# Patient Record
Sex: FEMALE | Race: WHITE | NOT HISPANIC OR LATINO | Employment: OTHER | URBAN - METROPOLITAN AREA
[De-identification: names, ages, dates, MRNs, and addresses within clinical notes are randomized per-mention and may not be internally consistent; named-entity substitution may affect disease eponyms.]

---

## 2024-11-20 ENCOUNTER — TELEPHONE (OUTPATIENT)
Dept: FAMILY MEDICINE | Facility: CLINIC | Age: 74
End: 2024-11-20
Payer: MEDICARE

## 2024-11-20 NOTE — TELEPHONE ENCOUNTER
Called patient to remind her of her new patient appointment. No answer but left a detailed message and instructions to bring all medication and supplement bottles.

## 2024-11-27 ENCOUNTER — OFFICE VISIT (OUTPATIENT)
Dept: FAMILY MEDICINE | Facility: CLINIC | Age: 74
End: 2024-11-27
Payer: MEDICARE

## 2024-11-27 VITALS
DIASTOLIC BLOOD PRESSURE: 76 MMHG | OXYGEN SATURATION: 97 % | HEIGHT: 65 IN | BODY MASS INDEX: 32.32 KG/M2 | WEIGHT: 194 LBS | HEART RATE: 64 BPM | SYSTOLIC BLOOD PRESSURE: 134 MMHG

## 2024-11-27 DIAGNOSIS — Z13.820 OSTEOPOROSIS SCREENING: Chronic | ICD-10-CM

## 2024-11-27 DIAGNOSIS — Z12.31 OTHER SCREENING MAMMOGRAM: ICD-10-CM

## 2024-11-27 DIAGNOSIS — F41.1 GENERALIZED ANXIETY DISORDER: ICD-10-CM

## 2024-11-27 DIAGNOSIS — I10 PRIMARY HYPERTENSION: ICD-10-CM

## 2024-11-27 DIAGNOSIS — E78.2 MIXED HYPERLIPIDEMIA: ICD-10-CM

## 2024-11-27 DIAGNOSIS — I48.0 PAROXYSMAL A-FIB: Primary | ICD-10-CM

## 2024-11-27 DIAGNOSIS — G47.33 OSA (OBSTRUCTIVE SLEEP APNEA): ICD-10-CM

## 2024-11-27 DIAGNOSIS — Z76.89 ENCOUNTER TO ESTABLISH CARE WITH NEW DOCTOR: ICD-10-CM

## 2024-11-27 DIAGNOSIS — Z78.0 MENOPAUSE: ICD-10-CM

## 2024-11-27 PROBLEM — F34.1 DYSTHYMIA: Status: ACTIVE | Noted: 2024-11-27

## 2024-11-27 PROCEDURE — 3075F SYST BP GE 130 - 139MM HG: CPT | Mod: CPTII,S$GLB,, | Performed by: FAMILY MEDICINE

## 2024-11-27 PROCEDURE — 4010F ACE/ARB THERAPY RXD/TAKEN: CPT | Mod: CPTII,S$GLB,, | Performed by: FAMILY MEDICINE

## 2024-11-27 PROCEDURE — 1160F RVW MEDS BY RX/DR IN RCRD: CPT | Mod: CPTII,S$GLB,, | Performed by: FAMILY MEDICINE

## 2024-11-27 PROCEDURE — 3288F FALL RISK ASSESSMENT DOCD: CPT | Mod: CPTII,S$GLB,, | Performed by: FAMILY MEDICINE

## 2024-11-27 PROCEDURE — 3008F BODY MASS INDEX DOCD: CPT | Mod: CPTII,S$GLB,, | Performed by: FAMILY MEDICINE

## 2024-11-27 PROCEDURE — 3078F DIAST BP <80 MM HG: CPT | Mod: CPTII,S$GLB,, | Performed by: FAMILY MEDICINE

## 2024-11-27 PROCEDURE — 1159F MED LIST DOCD IN RCRD: CPT | Mod: CPTII,S$GLB,, | Performed by: FAMILY MEDICINE

## 2024-11-27 PROCEDURE — 99204 OFFICE O/P NEW MOD 45 MIN: CPT | Mod: S$GLB,,, | Performed by: FAMILY MEDICINE

## 2024-11-27 PROCEDURE — 1101F PT FALLS ASSESS-DOCD LE1/YR: CPT | Mod: CPTII,S$GLB,, | Performed by: FAMILY MEDICINE

## 2024-11-27 RX ORDER — PAROXETINE 10 MG/1
10 TABLET, FILM COATED ORAL EVERY MORNING
Qty: 90 TABLET | Refills: 3 | Status: SHIPPED | OUTPATIENT
Start: 2024-11-27 | End: 2025-11-27

## 2024-11-27 RX ORDER — METOPROLOL TARTRATE 25 MG/1
25 TABLET, FILM COATED ORAL 2 TIMES DAILY
COMMUNITY
End: 2024-11-27 | Stop reason: SDUPTHER

## 2024-11-27 RX ORDER — ROSUVASTATIN CALCIUM 20 MG/1
20 TABLET, COATED ORAL NIGHTLY
COMMUNITY
Start: 2024-09-25

## 2024-11-27 RX ORDER — ALPRAZOLAM 1 MG/1
1 TABLET ORAL 2 TIMES DAILY PRN
COMMUNITY
Start: 2024-09-27 | End: 2024-11-27 | Stop reason: SDUPTHER

## 2024-11-27 RX ORDER — ALPRAZOLAM 1 MG/1
1 TABLET ORAL 2 TIMES DAILY PRN
Qty: 60 TABLET | Refills: 5 | Status: SHIPPED | OUTPATIENT
Start: 2024-11-27

## 2024-11-27 RX ORDER — DOXEPIN HYDROCHLORIDE 10 MG/1
10 CAPSULE ORAL NIGHTLY PRN
COMMUNITY

## 2024-11-27 RX ORDER — ALPRAZOLAM 1 MG/1
1 TABLET, EXTENDED RELEASE ORAL DAILY
COMMUNITY
End: 2024-11-27

## 2024-11-27 RX ORDER — OLMESARTAN MEDOXOMIL 20 MG/1
20 TABLET ORAL DAILY
COMMUNITY

## 2024-11-27 RX ORDER — METOPROLOL TARTRATE 25 MG/1
25 TABLET, FILM COATED ORAL 2 TIMES DAILY
Qty: 180 TABLET | Refills: 1 | Status: SHIPPED | OUTPATIENT
Start: 2024-11-27

## 2024-11-27 NOTE — PROGRESS NOTES
SUBJECTIVE:    Patient ID: Scarlett Palacio is a 74 y.o. female.    Chief Complaint: Establish Care (New patient to establish care//brought med bottles//needs referral to cardiologist//needs refills//declined colonoscopy, mammogram and dexa ordered//declined flu vac//tc)    Patient with paroxysmal atrial fibrillation who reports history of cardiac ablation is here to establish care with a new physician.  Has moved from [place].  Currently taking metoprolol and Eliquis due to her AFib.  Olmesartan for blood pressure.  Blood pressure is well-controlled.  Heart rate is normal.  She has questions about continuing her Eliquis.  States recently she was informed by her cardiologist that she may be able to get off of Eliquis and continue only aspirin since cardiac ablation.  However she has not had follow-up in the past year.    Most recent labs done by her primary care provider available performed in March of this year normal CBC, CMP and lipid panel.  She remains on Crestor.    Patient has had flu and pneumonia vaccines in the past as well as COVID vaccinations but declines any further vaccines.  Mammogram last in 2021.  Has had a bone density in the past but is unsure of the results.      She manages generalized anxiety with alprazolam.  Had been prescribed doxepin as needed for sleeping uses this intermittently.  Given paroxetine for her mood is well but had not been taking this medication on a regular basis.          Past Medical History:   Diagnosis Date    Allergy     Anxiety     Arthritis     Atrial fibrillation     Cataract     Depression     Hyperlipidemia     Hypertension      Past Surgical History:   Procedure Laterality Date    ANKLE SURGERY  1984    ligsmrnt repair    APPENDECTOMY N/A 11/17/1999    CATARACT EXTRACTION, BILATERAL  2017    TUBAL LIGATION Bilateral 1979     Family History   Problem Relation Name Age of Onset    Hypertension Mother      Arthritis Mother      Dementia Mother      Depression Father       Hyperlipidemia Father      Hypertension Father      Heart disease Father      Alcohol abuse Father         Marital Status:   Alcohol History:  reports current alcohol use of about 1.0 standard drink of alcohol per week.  Tobacco History:  reports that she has never smoked. She has never used smokeless tobacco.  Drug History:  reports no history of drug use.    Review of patient's allergies indicates:  Not on File    Current Outpatient Medications:     olmesartan (BENICAR) 20 MG tablet, Take 20 mg by mouth once daily., Disp: , Rfl:     rosuvastatin (CRESTOR) 20 MG tablet, Take 20 mg by mouth every evening., Disp: , Rfl:     ALPRAZolam (XANAX) 1 MG tablet, Take 1 tablet (1 mg total) by mouth 2 (two) times daily as needed for Anxiety., Disp: 60 tablet, Rfl: 5    apixaban (ELIQUIS) 5 mg Tab, Take 1 tablet (5 mg total) by mouth 2 (two) times daily., Disp: 60 tablet, Rfl: 5    doxepin (SINEQUAN) 10 MG capsule, Take 10 mg by mouth nightly as needed (sleep)., Disp: , Rfl:     metoprolol tartrate (LOPRESSOR) 25 MG tablet, Take 1 tablet (25 mg total) by mouth 2 (two) times daily., Disp: 180 tablet, Rfl: 1    paroxetine (PAXIL) 10 MG tablet, Take 1 tablet (10 mg total) by mouth every morning., Disp: 90 tablet, Rfl: 3    Review of Systems   Constitutional:  Positive for fatigue. Negative for activity change and unexpected weight change.   HENT:  Negative for hearing loss, postnasal drip, sinus pressure, sore throat and voice change.    Eyes:  Negative for photophobia and visual disturbance.   Respiratory:  Negative for cough, shortness of breath and wheezing.    Cardiovascular:  Negative for chest pain and palpitations.   Gastrointestinal:  Negative for constipation, diarrhea and nausea.   Genitourinary:  Negative for difficulty urinating, frequency, hematuria and urgency.   Musculoskeletal:  Negative for arthralgias and back pain.   Skin:  Negative for rash.   Neurological:  Negative for weakness, light-headedness  "and headaches.   Hematological:  Negative for adenopathy. Does not bruise/bleed easily.   Psychiatric/Behavioral:  The patient is not nervous/anxious.           Objective:          11/27/2024    11:23 AM   Vitals - 1 value per visit   SYSTOLIC 134   DIASTOLIC 76   Pulse 64   SPO2 97 %   Weight (lb) 194   Weight (kg) 87.998   Height 5' 5" (1.651 m)   BMI (Calculated) 32.3      Physical Exam  Constitutional:       Appearance: Normal appearance.   HENT:      Head: Normocephalic and atraumatic.      Mouth/Throat:      Mouth: Mucous membranes are moist.   Eyes:      Conjunctiva/sclera: Conjunctivae normal.   Cardiovascular:      Rate and Rhythm: Normal rate and regular rhythm.      Heart sounds: Normal heart sounds.   Pulmonary:      Effort: Pulmonary effort is normal.   Musculoskeletal:      Right lower leg: No edema.      Left lower leg: No edema.   Skin:     General: Skin is warm and dry.   Neurological:      General: No focal deficit present.      Mental Status: She is alert and oriented to person, place, and time.   Psychiatric:         Mood and Affect: Mood normal.         Behavior: Behavior normal.           Assessment:       1. Paroxysmal A-fib    2. Generalized anxiety disorder    3. Mixed hyperlipidemia    4. Primary hypertension    5. STEVIE (obstructive sleep apnea)    6. Encounter to establish care with new doctor    7. Other screening mammogram    8. Osteoporosis screening    9. Menopause         Plan:       Paroxysmal A-fib  Comments:  Patient will contact her previous cardiologist about cessation of Eliquis  Orders:  -     apixaban (ELIQUIS) 5 mg Tab; Take 1 tablet (5 mg total) by mouth 2 (two) times daily.  Dispense: 60 tablet; Refill: 5  -     metoprolol tartrate (LOPRESSOR) 25 MG tablet; Take 1 tablet (25 mg total) by mouth 2 (two) times daily.  Dispense: 180 tablet; Refill: 1  -     TSH; Future; Expected date: 05/15/2025    Generalized anxiety disorder  Comments:  Encouraged to resume Paxil  Orders:  -   "   paroxetine (PAXIL) 10 MG tablet; Take 1 tablet (10 mg total) by mouth every morning.  Dispense: 90 tablet; Refill: 3  -     ALPRAZolam (XANAX) 1 MG tablet; Take 1 tablet (1 mg total) by mouth 2 (two) times daily as needed for Anxiety.  Dispense: 60 tablet; Refill: 5    Mixed hyperlipidemia  Comments:  Continue rosuvastatin.  Orders:  -     Lipid Panel; Future; Expected date: 05/15/2025  -     Comprehensive Metabolic Panel; Future; Expected date: 05/15/2025    Primary hypertension  Comments:  Well-controlled  Orders:  -     CBC Auto Differential; Future; Expected date: 05/15/2025    STEVIE (obstructive sleep apnea)  Comments:  Longstanding diagnosis.  Patient can not tolerate CPAP    Encounter to establish care with new doctor    Other screening mammogram  -     Mammo Digital Screening Bilat; Future; Expected date: 11/27/2024    Osteoporosis screening  -     DXA Bone Density Axial Skeleton 1 or more sites; Future; Expected date: 11/27/2024    Menopause  -     DXA Bone Density Axial Skeleton 1 or more sites; Future; Expected date: 11/27/2024      Medication List with Changes/Refills   New Medications    PAROXETINE (PAXIL) 10 MG TABLET    Take 1 tablet (10 mg total) by mouth every morning.   Current Medications    DOXEPIN (SINEQUAN) 10 MG CAPSULE    Take 10 mg by mouth nightly as needed (sleep).    OLMESARTAN (BENICAR) 20 MG TABLET    Take 20 mg by mouth once daily.    ROSUVASTATIN (CRESTOR) 20 MG TABLET    Take 20 mg by mouth every evening.   Changed and/or Refilled Medications    Modified Medication Previous Medication    ALPRAZOLAM (XANAX) 1 MG TABLET ALPRAZolam (XANAX) 1 MG tablet       Take 1 tablet (1 mg total) by mouth 2 (two) times daily as needed for Anxiety.    Take 1 mg by mouth 2 (two) times daily as needed.    APIXABAN (ELIQUIS) 5 MG TAB apixaban (ELIQUIS) 5 mg Tab       Take 1 tablet (5 mg total) by mouth 2 (two) times daily.    Take 5 mg by mouth 2 (two) times daily.    METOPROLOL TARTRATE (LOPRESSOR) 25  MG TABLET metoprolol tartrate (LOPRESSOR) 25 MG tablet       Take 1 tablet (25 mg total) by mouth 2 (two) times daily.    Take 25 mg by mouth 2 (two) times daily.   Discontinued Medications    ALPRAZOLAM (XANAX XR) 1 MG TB24    Take 1 mg by mouth once daily.      Follow up in about 6 months (around 5/27/2025) for Annual Physical, HTN.      Stable on medications continue current

## 2024-12-12 ENCOUNTER — HOSPITAL ENCOUNTER (OUTPATIENT)
Dept: RADIOLOGY | Facility: HOSPITAL | Age: 74
Discharge: HOME | End: 2024-12-12
Attending: FAMILY MEDICINE
Payer: MEDICARE

## 2024-12-12 VITALS — BODY MASS INDEX: 32.32 KG/M2 | WEIGHT: 194 LBS | HEIGHT: 65 IN

## 2024-12-12 DIAGNOSIS — Z78.0 MENOPAUSE: ICD-10-CM

## 2024-12-12 DIAGNOSIS — Z12.31 OTHER SCREENING MAMMOGRAM: ICD-10-CM

## 2024-12-12 DIAGNOSIS — Z13.820 OSTEOPOROSIS SCREENING: Chronic | ICD-10-CM

## 2024-12-12 PROCEDURE — 77080 DXA BONE DENSITY AXIAL: CPT | Mod: 26,,, | Performed by: RADIOLOGY

## 2024-12-12 PROCEDURE — 77080 DXA BONE DENSITY AXIAL: CPT | Mod: TC,PO

## 2024-12-12 PROCEDURE — 77063 BREAST TOMOSYNTHESIS BI: CPT | Mod: TC,PO

## 2025-02-07 RX ORDER — OLMESARTAN MEDOXOMIL 20 MG/1
20 TABLET ORAL DAILY
Qty: 90 TABLET | Refills: 1 | Status: SHIPPED | OUTPATIENT
Start: 2025-02-07

## 2025-02-07 NOTE — TELEPHONE ENCOUNTER
The patient's prescription has been approved and sent to   [id number] - [place], [place] - [address] [place] [address] [address]  [address] [facility] [address]  [place] [address]  Phone: [phone] Fax: [phone]

## 2025-02-07 NOTE — TELEPHONE ENCOUNTER
----- Message from Nikole sent at 2/7/2025  8:57 AM CST -----  Pt needs refill on olmesartan  [facility]   [phone]

## 2025-02-25 DIAGNOSIS — I48.0 PAROXYSMAL A-FIB: ICD-10-CM

## 2025-02-25 NOTE — TELEPHONE ENCOUNTER
----- Message from Zee sent at 2/25/2025  2:51 PM CST -----  Refill [facility] in [facility] pt's # [phone] [facility]

## 2025-02-26 DIAGNOSIS — I48.0 PAROXYSMAL A-FIB: ICD-10-CM

## 2025-02-26 NOTE — TELEPHONE ENCOUNTER
----- Message from Zee sent at 2/26/2025 10:31 AM CST -----  Refill Eliques.  [facility] in [facility]  [facility]armacy. Pharmacy's  # [phone] PT'S # [phone] [facility]

## 2025-02-26 NOTE — TELEPHONE ENCOUNTER
prescription sent to   [id number] - [place], [place] - [address]  [address]  [place] [address]  Phone: [phone] Fax: [phone]

## 2025-03-04 ENCOUNTER — OFFICE VISIT (OUTPATIENT)
Dept: URGENT CARE | Facility: CLINIC | Age: 75
End: 2025-03-04
Payer: MEDICARE

## 2025-03-04 VITALS
HEIGHT: 65 IN | SYSTOLIC BLOOD PRESSURE: 124 MMHG | TEMPERATURE: 99 F | HEART RATE: 65 BPM | RESPIRATION RATE: 17 BRPM | DIASTOLIC BLOOD PRESSURE: 64 MMHG | WEIGHT: 195 LBS | OXYGEN SATURATION: 97 % | BODY MASS INDEX: 32.49 KG/M2

## 2025-03-04 DIAGNOSIS — R05.1 ACUTE COUGH: ICD-10-CM

## 2025-03-04 DIAGNOSIS — H66.92 LEFT OTITIS MEDIA, UNSPECIFIED OTITIS MEDIA TYPE: Primary | ICD-10-CM

## 2025-03-04 DIAGNOSIS — J06.9 UPPER RESPIRATORY TRACT INFECTION, UNSPECIFIED TYPE: ICD-10-CM

## 2025-03-04 PROCEDURE — 99214 OFFICE O/P EST MOD 30 MIN: CPT | Mod: S$GLB,,, | Performed by: NURSE PRACTITIONER

## 2025-03-04 RX ORDER — TRAMADOL HYDROCHLORIDE 50 MG/1
TABLET ORAL
COMMUNITY

## 2025-03-04 RX ORDER — CETIRIZINE HYDROCHLORIDE 10 MG/1
TABLET ORAL
COMMUNITY

## 2025-03-04 RX ORDER — SERTRALINE HYDROCHLORIDE 100 MG/1
1 TABLET, FILM COATED ORAL DAILY
COMMUNITY

## 2025-03-04 RX ORDER — PREDNISONE 20 MG/1
20 TABLET ORAL DAILY
Qty: 5 TABLET | Refills: 0 | Status: SHIPPED | OUTPATIENT
Start: 2025-03-04 | End: 2025-03-09

## 2025-03-04 RX ORDER — BENZONATATE 100 MG/1
100 CAPSULE ORAL 3 TIMES DAILY PRN
Qty: 15 CAPSULE | Refills: 0 | Status: SHIPPED | OUTPATIENT
Start: 2025-03-04 | End: 2025-03-09

## 2025-03-04 RX ORDER — LORAZEPAM 0.5 MG/1
1 TABLET ORAL EVERY 8 HOURS PRN
COMMUNITY

## 2025-03-04 RX ORDER — AMOXICILLIN 500 MG/1
500 CAPSULE ORAL EVERY 8 HOURS
Qty: 30 CAPSULE | Refills: 0 | Status: SHIPPED | OUTPATIENT
Start: 2025-03-04 | End: 2025-03-14

## 2025-03-04 RX ORDER — TRAZODONE HYDROCHLORIDE 50 MG/1
TABLET ORAL
COMMUNITY

## 2025-03-04 NOTE — PROGRESS NOTES
"Subjective:      Patient ID: Scarlett Palacio is a 75 y.o. female.    Vitals:  height is 5' 5" (1.651 m) and weight is 88.5 kg (195 lb). Her oral temperature is 98.5 °F (36.9 °C). Her blood pressure is 124/64 and her pulse is 65. Her respiration is 17 and oxygen saturation is 97%.     Chief Complaint: Sore Throat (Symptoms started on 1 day ago. Symptoms are the following: sore throat, cough w/ mucus, nasal congestion, left ear fullness, headache . Symptoms treated with the following: coricidine, ibuprofen last dose last night. )    75-year-old afebrile female who presents today with chief complaint of nasal congestion, sore throat, cough, left earache, and intermittent bilateral frontal headaches.  She explains her symptoms started yesterday.  She reports that she is taking ibuprofen and Coricidin for symptoms.  She refused any testing.    Sore Throat   This is a new problem. The current episode started yesterday. The problem has been gradually worsening. There has been no fever. The pain is at a severity of 0/10. The patient is experiencing no pain. Associated symptoms include coughing, headaches and a plugged ear sensation. Associated symptoms comments: LEFT EAR FULLNESS, NASAL CONGESTION. She has tried NSAIDs (CORICIDINE) for the symptoms. The treatment provided mild relief.       HENT:  Positive for sore throat.    Respiratory:  Positive for cough.    Skin:  Negative for erythema.   Neurological:  Positive for headaches.      Objective:     Physical Exam   Constitutional: She is oriented to person, place, and time.  Non-toxic appearance. She does not appear ill. No distress. obesity  HENT:   Head: Normocephalic and atraumatic.   Ears:   Right Ear: Tympanic membrane, external ear and ear canal normal.   Left Ear: External ear and ear canal normal.      Comments: Positive erythema/bulging of left tympanic membrane.  Nose: Congestion present.   Mouth/Throat: Mucous membranes are moist. No posterior oropharyngeal " erythema. Oropharynx is clear.   Eyes: Conjunctivae are normal. Pupils are equal, round, and reactive to light. Extraocular movement intact   Neck: Neck supple. No neck rigidity present.   Cardiovascular: Normal rate, regular rhythm, normal heart sounds and normal pulses.   Pulmonary/Chest: Effort normal and breath sounds normal.   Abdominal: Normal appearance.   Musculoskeletal: Normal range of motion.         General: Normal range of motion.      Cervical back: She exhibits no tenderness.   Lymphadenopathy:     She has no cervical adenopathy.   Neurological: She is alert and oriented to person, place, and time. She displays no weakness. Gait normal.   Skin: Skin is warm, dry, not diaphoretic, not pale and no rash. Capillary refill takes less than 2 seconds. No bruising and No erythema no jaundice  Psychiatric: Her behavior is normal.   Vitals reviewed.      Assessment:     1. Left otitis media, unspecified otitis media type    2. Upper respiratory tract infection, unspecified type    3. Acute cough        Plan:       Left otitis media, unspecified otitis media type  -     amoxicillin (AMOXIL) 500 MG capsule; Take 1 capsule (500 mg total) by mouth every 8 (eight) hours. for 10 days  Dispense: 30 capsule; Refill: 0  -     predniSONE (DELTASONE) 20 MG tablet; Take 1 tablet (20 mg total) by mouth once daily. for 5 days  Dispense: 5 tablet; Refill: 0    Upper respiratory tract infection, unspecified type    Acute cough  -     benzonatate (TESSALON) 100 MG capsule; Take 1 capsule (100 mg total) by mouth 3 (three) times daily as needed for Cough.  Dispense: 15 capsule; Refill: 0      INSTRUCTIONS  Medications as prescribed.  Rest.  Increase oral fluids.  Follow up with your doctor as advised.  To ER for worsening of symptoms, or for any new symptoms as discussed.

## 2025-05-21 ENCOUNTER — TELEPHONE (OUTPATIENT)
Dept: FAMILY MEDICINE | Facility: CLINIC | Age: 75
End: 2025-05-21
Payer: MEDICARE

## 2025-05-21 NOTE — TELEPHONE ENCOUNTER
----- Message from Arthur Barbie sent at 5/15/2025  8:32 AM CDT -----    ----- Message -----  From: Wendy Louie MD  Sent: 5/15/2025  12:00 AM CDT  To: Wendy Louie Staff    Remind patient to get labs prior to upcoming appointment

## 2025-05-21 NOTE — TELEPHONE ENCOUNTER
Left message for patient to return call to notify fasting lab orders available with [facility] to complete one week prior to upcoming visit. -DN

## 2025-05-22 ENCOUNTER — TELEPHONE (OUTPATIENT)
Dept: FAMILY MEDICINE | Facility: CLINIC | Age: 75
End: 2025-05-22
Payer: MEDICARE

## 2025-05-22 NOTE — TELEPHONE ENCOUNTER
----- Message from Arthur Barbie sent at 5/15/2025  8:32 AM CDT -----    ----- Message -----  From: Wendy Louie MD  Sent: 5/15/2025  12:00 AM CDT  To: Wendy Louie Staff    Remind patient to get labs prior to upcoming appointment   abd cramping/HEMATURIA/PAIN/BURNING URINATION

## 2025-05-22 NOTE — TELEPHONE ENCOUNTER
Left message for patient to return call to notify fasting lab orders available with [facility] to complete one week prior to upcoming visit. -DN

## 2025-05-29 ENCOUNTER — OFFICE VISIT (OUTPATIENT)
Dept: FAMILY MEDICINE | Facility: CLINIC | Age: 75
End: 2025-05-29
Payer: MEDICARE

## 2025-05-29 VITALS
HEIGHT: 65 IN | BODY MASS INDEX: 32.99 KG/M2 | OXYGEN SATURATION: 98 % | WEIGHT: 198 LBS | HEART RATE: 69 BPM | DIASTOLIC BLOOD PRESSURE: 68 MMHG | SYSTOLIC BLOOD PRESSURE: 132 MMHG

## 2025-05-29 DIAGNOSIS — I48.0 PAROXYSMAL A-FIB: ICD-10-CM

## 2025-05-29 DIAGNOSIS — L57.0 ACTINIC KERATOSES: ICD-10-CM

## 2025-05-29 DIAGNOSIS — I10 PRIMARY HYPERTENSION: Primary | ICD-10-CM

## 2025-05-29 DIAGNOSIS — M85.80 SENILE OSTEOPENIA: ICD-10-CM

## 2025-05-29 DIAGNOSIS — F41.1 GENERALIZED ANXIETY DISORDER: ICD-10-CM

## 2025-05-29 DIAGNOSIS — E78.2 MIXED HYPERLIPIDEMIA: ICD-10-CM

## 2025-05-29 DIAGNOSIS — Z51.81 ENCOUNTER FOR THERAPEUTIC DRUG MONITORING: ICD-10-CM

## 2025-05-29 DIAGNOSIS — Z11.59 ENCOUNTER FOR HEPATITIS C SCREENING TEST FOR LOW RISK PATIENT: ICD-10-CM

## 2025-05-29 DIAGNOSIS — Z79.899 ENCOUNTER FOR LONG-TERM CURRENT USE OF HIGH RISK MEDICATION: ICD-10-CM

## 2025-05-29 DIAGNOSIS — H02.839 HOODED UPPER EYELID: ICD-10-CM

## 2025-05-29 PROCEDURE — 3075F SYST BP GE 130 - 139MM HG: CPT | Mod: CPTII,S$GLB,, | Performed by: FAMILY MEDICINE

## 2025-05-29 PROCEDURE — 99214 OFFICE O/P EST MOD 30 MIN: CPT | Mod: S$GLB,,, | Performed by: FAMILY MEDICINE

## 2025-05-29 PROCEDURE — 1160F RVW MEDS BY RX/DR IN RCRD: CPT | Mod: CPTII,S$GLB,, | Performed by: FAMILY MEDICINE

## 2025-05-29 PROCEDURE — 1159F MED LIST DOCD IN RCRD: CPT | Mod: CPTII,S$GLB,, | Performed by: FAMILY MEDICINE

## 2025-05-29 PROCEDURE — 4010F ACE/ARB THERAPY RXD/TAKEN: CPT | Mod: CPTII,S$GLB,, | Performed by: FAMILY MEDICINE

## 2025-05-29 PROCEDURE — 3078F DIAST BP <80 MM HG: CPT | Mod: CPTII,S$GLB,, | Performed by: FAMILY MEDICINE

## 2025-05-29 RX ORDER — METOPROLOL TARTRATE 25 MG/1
25 TABLET, FILM COATED ORAL 2 TIMES DAILY
Qty: 180 TABLET | Refills: 3 | Status: SHIPPED | OUTPATIENT
Start: 2025-05-29

## 2025-05-29 RX ORDER — ROSUVASTATIN CALCIUM 20 MG/1
20 TABLET, COATED ORAL NIGHTLY
Qty: 90 TABLET | Refills: 3 | Status: SHIPPED | OUTPATIENT
Start: 2025-05-29

## 2025-05-29 RX ORDER — OLMESARTAN MEDOXOMIL 20 MG/1
20 TABLET ORAL DAILY
Qty: 90 TABLET | Refills: 3 | Status: SHIPPED | OUTPATIENT
Start: 2025-05-29

## 2025-05-29 RX ORDER — ALPRAZOLAM 1 MG/1
1 TABLET ORAL 2 TIMES DAILY PRN
Qty: 60 TABLET | Refills: 5 | Status: SHIPPED | OUTPATIENT
Start: 2025-05-29

## 2025-05-29 NOTE — PROGRESS NOTES
SUBJECTIVE:   HPI: Scarlett Palacio  is a 75 y.o. female who presents for regular visit   Annual Exam (-HepC Screening Due/-Pneumonia  C19  Tdap  Shingles  RSV Vaccinations Due/-Lipid Screening Due)    History of Present Illness    CHIEF COMPLAINT:  Patient presents today for routine exam    RECENT EMERGENCY ROOM VISIT:  She reports an emergency room visit 6 weeks ago due to sudden unilateral pupil dilation upon waking. CT and MRI were performed, and she was kept overnight for observation. The dilated pupil resolved the following day.    CURRENT MEDICAL CONDITIONS:  She has atrial fibrillation status post ablation, hypertension, hyperlipidemia, and osteopenia in hip confirmed by bone density scan in December.    MEDICATIONS:  She takes Eliquis and metoprolol for atrial fibrillation, Benicar for blood pressure, paroxetine for mood, alprazolam, rosuvastatin for cholesterol, and Zyrtec as needed for allergies.    MUSCULOSKELETAL AND WEIGHT:  She reports generalized aches, pains, and stiffness throughout her body. She notes weight gain which she attributes to decreased physical activity since stopping work.    OPHTHALMOLOGIC:  She reports blurry vision related to hooded eyelids and itchy eyelids of unknown cause.    DERMATOLOGIC:  She notes multiple rough spots and widespread brown spots, which she describes as age spots, and desires dermatologic evaluation.    PREVENTIVE CARE:  Mammogram in December was normal.         (Not in a hospital admission)    Review of patient's allergies indicates:  No Known Allergies  Medications Ordered Prior to Encounter[1]  Past Medical History:   Diagnosis Date    A-fib     Allergy     Anxiety     Arthritis     Atrial fibrillation     Cataract     Depression     Hyperlipidemia     Hypertension      Past Surgical History:   Procedure Laterality Date    ANKLE SURGERY  1984    ligsmrnt repair    APPENDECTOMY N/A 11/17/1999    AUGMENTATION OF BREAST      CATARACT EXTRACTION, BILATERAL   2017    TUBAL LIGATION Bilateral 1979     Family History   Problem Relation Name Age of Onset    Hypertension Mother      Arthritis Mother      Dementia Mother      Depression Father      Hyperlipidemia Father      Hypertension Father      Heart disease Father      Alcohol abuse Father      Breast cancer Daughter      Breast cancer Paternal Grandmother       Social History[2]   Health Maintenance Topics with due status: Not Due       Topic Last Completion Date    Colorectal Cancer Screening 10/22/2019    Influenza Vaccine 09/08/2021    DEXA Scan 12/12/2024     Immunization History   Administered Date(s) Administered    COVID-19, MRNA, LN-S, PF (Pfizer) (Gray Cap) 04/08/2021, 04/30/2021, 12/23/2021    Influenza - Trivalent - Fluzone High Dose - PF (65 years and older) 09/08/2021    Pneumococcal Conjugate - 13 Valent 01/10/2022       Review of Systems   Constitutional:  Positive for unexpected weight change. Negative for activity change and fatigue.   HENT:  Negative for hearing loss, postnasal drip, sinus pressure, sore throat and voice change.    Eyes:  Negative for photophobia and visual disturbance.   Respiratory:  Negative for cough, shortness of breath and wheezing.    Cardiovascular:  Negative for chest pain and palpitations.   Gastrointestinal:  Negative for constipation, diarrhea and nausea.   Genitourinary:  Negative for difficulty urinating, frequency, hematuria and urgency.   Musculoskeletal:  Negative for arthralgias and back pain.   Skin:  Negative for rash.   Neurological:  Negative for weakness, light-headedness and headaches.   Hematological:  Negative for adenopathy. Does not bruise/bleed easily.   Psychiatric/Behavioral:  The patient is not nervous/anxious.       OBJECTIVE:          3/4/2025    11:18 AM 5/29/2025     9:43 AM   Vitals - 1 value per visit   SYSTOLIC 124 132   DIASTOLIC 64 68   Pulse 65 69   Temp 98.5 °F (36.9 °C)    Resp 17    SPO2 97 % 98 %   Weight (lb) 195 198   Weight (kg) 88.451  "89.812   Height 5' 5" (1.651 m) 5' 5" (1.651 m)   BMI (Calculated) 32.4 32.9   Pain Score Zero       Physical Exam  Constitutional:       Appearance: Normal appearance.   HENT:      Head: Normocephalic and atraumatic.      Mouth/Throat:      Mouth: Mucous membranes are moist.   Eyes:      Conjunctiva/sclera: Conjunctivae normal.   Pulmonary:      Effort: Pulmonary effort is normal.   Neurological:      General: No focal deficit present.      Mental Status: She is alert and oriented to person, place, and time.   Psychiatric:         Mood and Affect: Mood normal.         Behavior: Behavior normal.          Assessment:       PAROXYSMAL A-FIB:  - Monitored patient with history of A-fib who underwent ablation.  - Blood pressure looks good.  - Will continue Eliquis and metoprolol for A-fib management for now.  - Referred to cardiologist for further evaluation and to assess possibility of discontinuing Eliquis post-ablation.    HYPERTENSION:  - Blood pressure is well controlled on current medication regimen.  - Continue olmesartan (Benicar) for management.    HYPERLIPIDEMIA:  - Continue rosuvastatin for hyperlipidemia management.  - Prescription reordered for ongoing treatment.    OSTEOPENIA:  - Bone scan results show mostly good bone density with some thinning in the hip, indicating mild osteopenia.  - Recommend increasing physical activity, particularly walking and weight-bearing exercises, to support bone health.    JOINT PAIN AND STIFFNESS:  - Patient reports aches, pains, and stiffness in joints.  - Recommend increasing physical activity and regular movement to improve mobility and alleviate musculoskeletal discomfort.    DERMATOCHALASIS AND VISION ISSUES:  - Patient reports issues with hooded eyelids affecting vision.  - Referred to ophthalmologist for assessment of dermatochalasis and potential blepharoplasty.    VISUAL DISTURBANCES:  - Patient reports blurry vision and itching eyelids.  - Referred to " ophthalmologist for comprehensive evaluation of these visual disturbances.    HYPERPIGMENTATION AND AGE SPOTS:  - Patient reports brown spots and age spots on skin.  - Referred to dermatologist for evaluation.    ACTINIC KERATOSIS:  - Patient reports rough spots on skin.  - Referred to dermatologist for evaluation of  keratosis lesions.    VITALY   Continue paroxetine and assess anxiety management with paroxetine and alprazolam.    GENERAL HEALTH RECOMMENDATIONS:  - Explained potential causes of eye irritation, including old makeup and unclean brushes.  - Recommend replacing old makeup and cleaning makeup brushes regularly.  - Reviewed shingles vaccine as a preventive measure for those with history of chickenpox.  - Advised patient to be cautious of sugar content in smoothies.  - Ordered comprehensive lab work including thyroid function, cholesterol levels, renal function, complete blood count, and urine drug screen.    FOLLOW-UP:  - Follow up in 6 months.            Plan:       Primary hypertension  -     Comprehensive Metabolic Panel; Future; Expected date: 05/29/2025  -     CBC Auto Differential; Future; Expected date: 05/29/2025  -     olmesartan (BENICAR) 20 MG tablet; Take 1 tablet (20 mg total) by mouth once daily.  Dispense: 90 tablet; Refill: 3    Generalized anxiety disorder  -     ALPRAZolam (XANAX) 1 MG tablet; Take 1 tablet (1 mg total) by mouth 2 (two) times daily as needed for Anxiety.  Dispense: 60 tablet; Refill: 5  -     DRUG MONITOR, PANEL 4, W/CONF, URINE; Future; Expected date: 05/29/2025    Paroxysmal A-fib  -     metoprolol tartrate (LOPRESSOR) 25 MG tablet; Take 1 tablet (25 mg total) by mouth 2 (two) times daily.  Dispense: 180 tablet; Refill: 3  -     TSH; Future; Expected date: 05/29/2025  -     apixaban (ELIQUIS) 5 mg Tab; Take 1 tablet (5 mg total) by mouth 2 (two) times daily.  Dispense: 60 tablet; Refill: 11    Actinic keratoses  -     Ambulatory referral/consult to Dermatology; Future;  Expected date: 06/05/2025    Mixed hyperlipidemia  -     rosuvastatin (CRESTOR) 20 MG tablet; Take 1 tablet (20 mg total) by mouth every evening. For cholesterol  Dispense: 90 tablet; Refill: 3  -     Lipid Panel; Future; Expected date: 05/29/2025    Hooded upper eyelid  -     Ambulatory referral/consult to Ophthalmology; Future; Expected date: 06/05/2025    Senile osteopenia    Encounter for long-term current use of high risk medication  -     DRUG MONITOR, PANEL 4, W/CONF, URINE; Future; Expected date: 05/29/2025    Encounter for therapeutic drug monitoring  -     DRUG MONITOR, PANEL 4, W/CONF, URINE; Future; Expected date: 05/29/2025    Encounter for hepatitis C screening test for low risk patient  -     Hepatitis C Antibody; Future; Expected date: 05/29/2025      Medication List with Changes/Refills   Current Medications    CETIRIZINE (ZYRTEC) 10 MG TABLET        PAROXETINE (PAXIL) 10 MG TABLET    Take 1 tablet (10 mg total) by mouth every morning.   Changed and/or Refilled Medications    Modified Medication Previous Medication    ALPRAZOLAM (XANAX) 1 MG TABLET ALPRAZolam (XANAX) 1 MG tablet       Take 1 tablet (1 mg total) by mouth 2 (two) times daily as needed for Anxiety.    Take 1 tablet (1 mg total) by mouth 2 (two) times daily as needed for Anxiety.    APIXABAN (ELIQUIS) 5 MG TAB apixaban (ELIQUIS) 5 mg Tab       Take 1 tablet (5 mg total) by mouth 2 (two) times daily.    Take 1 tablet (5 mg total) by mouth 2 (two) times daily.    METOPROLOL TARTRATE (LOPRESSOR) 25 MG TABLET metoprolol tartrate (LOPRESSOR) 25 MG tablet       Take 1 tablet (25 mg total) by mouth 2 (two) times daily.    Take 1 tablet (25 mg total) by mouth 2 (two) times daily.    OLMESARTAN (BENICAR) 20 MG TABLET olmesartan (BENICAR) 20 MG tablet       Take 1 tablet (20 mg total) by mouth once daily.    Take 1 tablet (20 mg total) by mouth once daily.    ROSUVASTATIN (CRESTOR) 20 MG TABLET rosuvastatin (CRESTOR) 20 MG tablet       Take 1  tablet (20 mg total) by mouth every evening. For cholesterol    Take 20 mg by mouth every evening.   Discontinued Medications    CHLORPHENIRAMINE-PHENYLEPHRINE 4-10 MG PER TABLET    TK 1 T PO  BID    DOXEPIN (SINEQUAN) 10 MG CAPSULE    Take 10 mg by mouth nightly as needed (sleep).    LORATADINE-PSEUDOEPHEDRINE 5-120 MG (CLARITIN-D 12-HOUR) 5-120 MG PER TABLET    Take 1 tablet by mouth 2 (two) times daily.    LORAZEPAM (ATIVAN) 0.5 MG TABLET    Take 1 tablet by mouth every 8 (eight) hours as needed.    SERTRALINE (ZOLOFT) 100 MG TABLET    Take 1 tablet by mouth once daily.    TRAMADOL (ULTRAM) 50 MG TABLET        TRAZODONE (DESYREL) 50 MG TABLET    TAKE 1-2 TABLETS BY MOUTH NIGHTLY        Counseled on age and gender appropriate medical preventative services, including cancer screenings, immunizations, overall nutritional health, need for a consistent exercise regimen and an overall push towards maintaining a vigorous and active lifestyle.      Follow up in about 6 months (around 11/29/2025) for HTN, afib .        This note was generated with the assistance of ambient listening technology. Verbal consent was obtained by the patient and accompanying visitor(s) for the recording of patient appointment to facilitate this note. I attest to having reviewed and edited the generated note for accuracy, though some syntax or spelling errors may persist. Please contact the author of this note for any clarification.                   [1]   Current Outpatient Medications on File Prior to Visit   Medication Sig Dispense Refill    cetirizine (ZYRTEC) 10 MG tablet       [DISCONTINUED] ALPRAZolam (XANAX) 1 MG tablet Take 1 tablet (1 mg total) by mouth 2 (two) times daily as needed for Anxiety. 60 tablet 5    [DISCONTINUED] apixaban (ELIQUIS) 5 mg Tab Take 1 tablet (5 mg total) by mouth 2 (two) times daily. 60 tablet 5    [DISCONTINUED] metoprolol tartrate (LOPRESSOR) 25 MG tablet Take 1 tablet (25 mg total) by mouth 2 (two) times  daily. 180 tablet 1    [DISCONTINUED] olmesartan (BENICAR) 20 MG tablet Take 1 tablet (20 mg total) by mouth once daily. 90 tablet 1    [DISCONTINUED] rosuvastatin (CRESTOR) 20 MG tablet Take 20 mg by mouth every evening.      paroxetine (PAXIL) 10 MG tablet Take 1 tablet (10 mg total) by mouth every morning. (Patient not taking: Reported on 3/4/2025) 90 tablet 3    [DISCONTINUED] chlorpheniramine-phenylephrine 4-10 mg per tablet TK 1 T PO  BID (Patient not taking: Reported on 5/29/2025)      [DISCONTINUED] doxepin (SINEQUAN) 10 MG capsule Take 10 mg by mouth nightly as needed (sleep). (Patient not taking: Reported on 5/29/2025)      [DISCONTINUED] loratadine-pseudoephedrine 5-120 mg (CLARITIN-D 12-HOUR) 5-120 mg per tablet Take 1 tablet by mouth 2 (two) times daily. (Patient not taking: Reported on 5/29/2025)      [DISCONTINUED] LORazepam (ATIVAN) 0.5 MG tablet Take 1 tablet by mouth every 8 (eight) hours as needed. (Patient not taking: Reported on 5/29/2025)      [DISCONTINUED] sertraline (ZOLOFT) 100 MG tablet Take 1 tablet by mouth once daily. (Patient not taking: Reported on 5/29/2025)      [DISCONTINUED] traMADoL (ULTRAM) 50 mg tablet  (Patient not taking: Reported on 5/29/2025)      [DISCONTINUED] traZODone (DESYREL) 50 MG tablet TAKE 1-2 TABLETS BY MOUTH NIGHTLY (Patient not taking: Reported on 5/29/2025)       No current facility-administered medications on file prior to visit.   [2]   Social History  Tobacco Use    Smoking status: Never    Smokeless tobacco: Never   Substance Use Topics    Alcohol use: Yes     Alcohol/week: 1.0 standard drink of alcohol     Types: 1 Glasses of wine per week     Comment: per week    Drug use: Never

## 2025-05-31 LAB
1OH-MIDAZOLAM UR-MCNC: NEGATIVE NG/ML
7AMINOCLONAZEPAM UR-MCNC: NEGATIVE NG/ML
A-OH ALPRAZ UR-MCNC: 352 NG/ML
A-OH-TRIAZOLAM UR-MCNC: NEGATIVE NG/ML
ALBUMIN SERPL-MCNC: 4.5 G/DL (ref 3.6–5.1)
ALBUMIN/GLOB SERPL: 1.7 (CALC) (ref 1–2.5)
ALP SERPL-CCNC: 65 U/L (ref 37–153)
ALT SERPL-CCNC: 18 U/L (ref 6–29)
AMPHETAMINES UR QL: NEGATIVE NG/ML
AST SERPL-CCNC: 24 U/L (ref 10–35)
BARBITURATES UR QL: NEGATIVE NG/ML
BASOPHILS # BLD AUTO: 64 CELLS/UL (ref 0–200)
BASOPHILS NFR BLD AUTO: 0.8 %
BENZODIAZ UR QL: POSITIVE NG/ML
BILIRUB SERPL-MCNC: 0.7 MG/DL (ref 0.2–1.2)
BUN SERPL-MCNC: 14 MG/DL (ref 7–25)
BUN/CREAT SERPL: NORMAL (CALC) (ref 6–22)
BZE UR QL: NEGATIVE NG/ML
CALCIUM SERPL-MCNC: 9.8 MG/DL (ref 8.6–10.4)
CHLORIDE SERPL-SCNC: 99 MMOL/L (ref 98–110)
CHOLEST SERPL-MCNC: 150 MG/DL
CHOLEST/HDLC SERPL: 2.8 (CALC)
CO2 SERPL-SCNC: 24 MMOL/L (ref 20–32)
CREAT SERPL-MCNC: 0.69 MG/DL (ref 0.6–1)
CREAT UR-MCNC: 121.3 MG/DL
DRUG SCREEN COMMENT UR-IMP: ABNORMAL
EGFR: 90 ML/MIN/1.73M2
EOSINOPHIL # BLD AUTO: 184 CELLS/UL (ref 15–500)
EOSINOPHIL NFR BLD AUTO: 2.3 %
ERYTHROCYTE [DISTWIDTH] IN BLOOD BY AUTOMATED COUNT: 12.4 % (ref 11–15)
GLOBULIN SER CALC-MCNC: 2.6 G/DL (CALC) (ref 1.9–3.7)
GLUCOSE SERPL-MCNC: 95 MG/DL (ref 65–99)
HCT VFR BLD AUTO: 46.1 % (ref 35–45)
HCV AB SERPL QL IA: NORMAL
HDLC SERPL-MCNC: 53 MG/DL
HGB BLD-MCNC: 14.9 G/DL (ref 11.7–15.5)
LDLC SERPL CALC-MCNC: 76 MG/DL (CALC)
LORAZEPAM UR-MCNC: NEGATIVE NG/ML
LYMPHOCYTES # BLD AUTO: 2400 CELLS/UL (ref 850–3900)
LYMPHOCYTES NFR BLD AUTO: 30 %
MCH RBC QN AUTO: 31.2 PG (ref 27–33)
MCHC RBC AUTO-ENTMCNC: 32.3 G/DL (ref 32–36)
MCV RBC AUTO: 96.4 FL (ref 80–100)
METHADONE UR QL: NEGATIVE NG/ML
MONOCYTES # BLD AUTO: 688 CELLS/UL (ref 200–950)
MONOCYTES NFR BLD AUTO: 8.6 %
NEUTROPHILS # BLD AUTO: 4664 CELLS/UL (ref 1500–7800)
NEUTROPHILS NFR BLD AUTO: 58.3 %
NONHDLC SERPL-MCNC: 97 MG/DL (CALC)
NORDIAZEPAM UR-MCNC: NEGATIVE NG/ML
NOTES AND COMMENTS: ABNORMAL
OH-ETHYLFLURAZ UR-MCNC: NEGATIVE NG/ML
OPIATES UR QL: NEGATIVE NG/ML
OXAZEPAM UR-MCNC: NEGATIVE NG/ML
OXIDANTS UR QL: NEGATIVE MCG/ML
OXYCODONE UR QL: NEGATIVE NG/ML
PCP UR QL: NEGATIVE NG/ML
PH UR: 6.4 [PH] (ref 4.5–9)
PLATELET # BLD AUTO: 292 THOUSAND/UL (ref 140–400)
PMV BLD REES-ECKER: 10.6 FL (ref 7.5–12.5)
POTASSIUM SERPL-SCNC: 4.7 MMOL/L (ref 3.5–5.3)
PROT SERPL-MCNC: 7.1 G/DL (ref 6.1–8.1)
RBC # BLD AUTO: 4.78 MILLION/UL (ref 3.8–5.1)
SODIUM SERPL-SCNC: 137 MMOL/L (ref 135–146)
TEMAZEPAM UR-MCNC: NEGATIVE NG/ML
TRIGL SERPL-MCNC: 128 MG/DL
TSH SERPL-ACNC: 1.85 MIU/L (ref 0.4–4.5)
WBC # BLD AUTO: 8 THOUSAND/UL (ref 3.8–10.8)

## 2025-08-25 DIAGNOSIS — Z00.00 ENCOUNTER FOR MEDICARE ANNUAL WELLNESS EXAM: ICD-10-CM
